# Patient Record
(demographics unavailable — no encounter records)

---

## 2024-10-31 NOTE — HEALTH RISK ASSESSMENT
[No] : No [Little interest or pleasure doing things] : 1) Little interest or pleasure doing things [Feeling down, depressed, or hopeless] : 2) Feeling down, depressed, or hopeless [0] : 2) Feeling down, depressed, or hopeless: Not at all (0) [PHQ-2 Negative - No further assessment needed] : PHQ-2 Negative - No further assessment needed [[id number]] : 0 [Never] : Never [With Family] : lives with family [# of Members in Household ___] :  household currently consist of [unfilled] member(s) [Less Than High School] : less than high school [] :  [# Of Children ___] : has [unfilled] children [Feels Safe at Home] : Feels safe at home [MammogramDate] : 7/09/24 [PapSmearDate] : 2023 [ColonoscopyDate] : never

## 2024-10-31 NOTE — ASSESSMENT
[FreeTextEntry1] : Physical  UTD with mammogram referred to GI for colonoscopy  Hx of hypothyroidism cont [place] Thyroid 15mg daily We'll check TSH/T4 today.  HLD - well controlled Pt is currently on Rosuvastatin 20 mg daily. we will decrease Rosuvastatin to 10mg Reinforced the importance of following a low cholesterol/low fat diet We'll check Lipid panel and LFTs today.  HTN, Hx of A fib cont Metoprolol 50 mg daily cont Eliquis 5mg BID cont Multaq Reinforced the importance of following a low sodium diet/increased physical activity.  DM II cont Mounjaro 12.5 mg weekly Reinforced the importance of following a low sugar/low carbohydrate diet We'll check glucose and HbA1c today.  All meds renewed.  declines flu vaccine  Blood work ordered. Follow up in one week for lab results.

## 2024-10-31 NOTE — HISTORY OF PRESENT ILLNESS
[de-identified] : Ms. TYLER PRESSLEY is a 53 year old female with Hx of HTN, A fib, DM II, HLD, hypothyroidism, who presents for an annual physical exam, Rx refills.   Pt states she is feeling well. Offers no complaints. Denies any SOB, CP, abdominal pain, N/V/D, headache, dizziness, or leg swelling. Reports compliance with taking her meds daily.

## 2024-10-31 NOTE — ASSESSMENT
[FreeTextEntry1] : Physical  UTD with mammogram referred to GI for colonoscopy  Hx of hypothyroidism cont [place] Thyroid 15mg daily We'll check TSH/T4 today.  HLD - well controlled Pt is currently on Rosuvastatin 20 mg daily. we will decrease Rosuvastatin to 10mg Reinforced the importance of following a low cholesterol/low fat diet We'll check Lipid panel and LFTs today.  HTN, Hx of A fib cont Metoprolol 50 mg daily cont Eliquis 5mg BID cont Multaq Reinforced the importance of following a low sodium diet/increased physical activity.  DM II cont Mounjaro 12.5 mg weekly Reinforced the importance of following a low sugar/low carbohydrate diet We'll check glucose and HbA1c today.  All meds renewed.  declines flu vaccine  Blood work ordered. Follow up in one week for lab results.

## 2024-10-31 NOTE — HEALTH RISK ASSESSMENT
[No] : No [Little interest or pleasure doing things] : 1) Little interest or pleasure doing things [Feeling down, depressed, or hopeless] : 2) Feeling down, depressed, or hopeless [0] : 2) Feeling down, depressed, or hopeless: Not at all (0) [PHQ-2 Negative - No further assessment needed] : PHQ-2 Negative - No further assessment needed [[id number]] : 0 [Never] : Never [With Family] : lives with family [# of Members in Household ___] :  household currently consist of [unfilled] member(s) [Less Than High School] : less than high school [] :  [# Of Children ___] : has [unfilled] children [Feels Safe at Home] : Feels safe at home [MammogramDate] : 7/09/24 [PapSmearDate] : 2023 [ColonoscopyDate] : never

## 2024-10-31 NOTE — ADDENDUM
[FreeTextEntry1] : Documented by Ivonne Chavez acting as a scribe for Dr. Kadi Snyder. 10/31/2024   All medical record entries made by the scribe were at my, Dr. Kadi Snyder, direction and personally dictated by me on 10/31/2024. I have reviewed the chart and agree that the record accurately reflects my personal performance of the history, physical exam, assessment and plan. I have also personally directed, reviewed, and agreed with the chart.

## 2024-10-31 NOTE — HISTORY OF PRESENT ILLNESS
[de-identified] : Ms. TYLER PRESSLEY is a 53 year old female with Hx of HTN, A fib, DM II, HLD, hypothyroidism, who presents for an annual physical exam, Rx refills.   Pt states she is feeling well. Offers no complaints. Denies any SOB, CP, abdominal pain, N/V/D, headache, dizziness, or leg swelling. Reports compliance with taking her meds daily.

## 2025-02-05 NOTE — HISTORY OF PRESENT ILLNESS
[de-identified] : Ms. TYLER PRESSLEY is a 53 year old female with Hx of HTN, A fib, DM II, HLD, hypothyroid, who presents for a follow up visit, Rx refills.  Pt states she is feeling well.  Denies any SOB, CP, abdominal pain, N/V/D, headache, dizziness, or leg swelling. Reports compliance with taking her meds daily.

## 2025-02-05 NOTE — PHYSICAL EXAM
[No Acute Distress] : no acute distress [Well-Appearing] : well-appearing [Normal Sclera/Conjunctiva] : normal sclera/conjunctiva [Normal Outer Ear/Nose] : the outer ears and nose were normal in appearance [Normal Oropharynx] : the oropharynx was normal [No JVD] : no jugular venous distention [No Lymphadenopathy] : no lymphadenopathy [Supple] : supple [No Respiratory Distress] : no respiratory distress  [No Accessory Muscle Use] : no accessory muscle use [Clear to Auscultation] : lungs were clear to auscultation bilaterally [Normal Rate] : normal rate  [Regular Rhythm] : with a regular rhythm [Normal S1, S2] : normal S1 and S2 [No Murmur] : no murmur heard [No Edema] : there was no peripheral edema [Soft] : abdomen soft [Non Tender] : non-tender [Non-distended] : non-distended [Normal Anterior Cervical Nodes] : no anterior cervical lymphadenopathy [No CVA Tenderness] : no CVA  tenderness [No Joint Swelling] : no joint swelling [No Rash] : no rash [Coordination Grossly Intact] : coordination grossly intact [No Focal Deficits] : no focal deficits [Normal Gait] : normal gait [Normal Affect] : the affect was normal [Normal Insight/Judgement] : insight and judgment were intact

## 2025-02-05 NOTE — ASSESSMENT
[FreeTextEntry1] : Follow up  Hx of hypothyroid cont [place] Thyroid 15mg daily  We'll check TSH/T4 today.  HLD pt wants to stop Rosuvastatin we'll decrease Rosuvastatin to 5mg daily -Rx sent to pharmacy Reinforced the importance of following a low cholesterol/low fat diet We'll check Lipid panel and LFTs today.  HTN, Hx of A fib cont Metoprolol 50 mg daily cont Eliquis 5mg BID Reinforced the importance of following a low sodium diet/increased physical activity.  DM II cont Mounjaro 12.5 mg weekly Reinforced the importance of following a low sugar/low carbohydrate diet We'll check glucose and HbA1c today.  All meds renewed.  Blood work ordered. Follow up in one week for lab results

## 2025-02-05 NOTE — ASSESSMENT
[FreeTextEntry1] : Follow up  Hx of hypothyroid cont [place] Thyroid 15mg daily  We'll check TSH/T4 today.  HLD pt wants to stop Rosuvastatin we'll decrease Rosuvastatin to 5mg daily -Rx sent to pharmacy Reinforced the importance of following a low cholesterol/low fat diet We'll check Lipid panel and LFTs today.  HTN, Hx of A fib cont Metoprolol 50 mg daily cont Eliquis 5mg BID Reinforced the importance of following a low sodium diet/increased physical activity.  DM II cont Mounjaro 12.5 mg weekly Reinforced the importance of following a low sugar/low carbohydrate diet We'll check glucose and HbA1c today.  All meds renewed.  Blood work ordered. Follow up in one week for lab results

## 2025-02-05 NOTE — HISTORY OF PRESENT ILLNESS
[de-identified] : Ms. TYLER PRESSLEY is a 53 year old female with Hx of HTN, A fib, DM II, HLD, hypothyroid, who presents for a follow up visit, Rx refills.  Pt states she is feeling well.  Denies any SOB, CP, abdominal pain, N/V/D, headache, dizziness, or leg swelling. Reports compliance with taking her meds daily.

## 2025-06-11 NOTE — PHYSICAL EXAM
[No Acute Distress] : no acute distress [Well-Appearing] : well-appearing [Normal Sclera/Conjunctiva] : normal sclera/conjunctiva [Normal Outer Ear/Nose] : the outer ears and nose were normal in appearance [No JVD] : no jugular venous distention [No Lymphadenopathy] : no lymphadenopathy [No Respiratory Distress] : no respiratory distress  [Supple] : supple [Clear to Auscultation] : lungs were clear to auscultation bilaterally [No Accessory Muscle Use] : no accessory muscle use [Regular Rhythm] : with a regular rhythm [Normal Rate] : normal rate  [No Murmur] : no murmur heard [Normal S1, S2] : normal S1 and S2 [No Edema] : there was no peripheral edema [Non Tender] : non-tender [Soft] : abdomen soft [Normal Anterior Cervical Nodes] : no anterior cervical lymphadenopathy [Non-distended] : non-distended [No CVA Tenderness] : no CVA  tenderness [No Rash] : no rash [No Joint Swelling] : no joint swelling [Coordination Grossly Intact] : coordination grossly intact [No Focal Deficits] : no focal deficits [Normal Gait] : normal gait [Normal Insight/Judgement] : insight and judgment were intact [Normal Affect] : the affect was normal

## 2025-06-11 NOTE — PHYSICAL EXAM
[No Acute Distress] : no acute distress [Well-Appearing] : well-appearing [Normal Sclera/Conjunctiva] : normal sclera/conjunctiva [Normal Outer Ear/Nose] : the outer ears and nose were normal in appearance [No JVD] : no jugular venous distention [No Lymphadenopathy] : no lymphadenopathy [No Respiratory Distress] : no respiratory distress  [Supple] : supple [No Accessory Muscle Use] : no accessory muscle use [Clear to Auscultation] : lungs were clear to auscultation bilaterally [Regular Rhythm] : with a regular rhythm [Normal Rate] : normal rate  [Normal S1, S2] : normal S1 and S2 [No Murmur] : no murmur heard [No Edema] : there was no peripheral edema [Non Tender] : non-tender [Soft] : abdomen soft [Normal Anterior Cervical Nodes] : no anterior cervical lymphadenopathy [Non-distended] : non-distended [No CVA Tenderness] : no CVA  tenderness [No Joint Swelling] : no joint swelling [No Rash] : no rash [No Focal Deficits] : no focal deficits [Coordination Grossly Intact] : coordination grossly intact [Normal Gait] : normal gait [Normal Insight/Judgement] : insight and judgment were intact [Normal Affect] : the affect was normal

## 2025-06-12 NOTE — ASSESSMENT
[FreeTextEntry1] : Follow up  Hx of hypothyroid cont [place] Thyroid 15mg daily  We'll check TSH/T4 today.  HLD cont Rosuvastatin to 5mg daily Reinforced the importance of following a low cholesterol/low fat diet We'll check Lipid panel and LFTs today.  HTN, Hx of A fib cont Metoprolol 50 mg daily cont Eliquis 5mg BID Reinforced the importance of following a low sodium diet/increased physical activity.  DM II cont Mounjaro 12.5 mg weekly Reinforced the importance of following a low sugar/low carbohydrate diet We'll check glucose and HbA1c today.  perimenopausal cont veozah 45mg daily  All meds renewed.  Blood work ordered. Follow up in one week for lab results

## 2025-06-12 NOTE — ADDENDUM
[FreeTextEntry1] : Documented by Juan Mckeon acting as a scribe for Dr. Kadi Snyder. 06/11/2025  All medical records entries made by the scribe were at my, Dr. Snyder, direction and personally dictated by me on 06/11/2025. I have reviewed the chart and agree that the record accurately reflects my personal performance of the history, physical exam, assessment and plan. I have also personally directed, reviewed, and agreed with the chart.

## 2025-06-12 NOTE — HISTORY OF PRESENT ILLNESS
[de-identified] : Ms. TYLER PRESSLEY is a 54 year old female with hx of HTN, A fib, DM II, HLD, hypothyroid, presenting for a follow up visit. Pt is accompanied by her family.   Pt c/o joint pain/stiffness in her b/l hands. Takes motrin or tylenol arthritis which she says helps Denies any SOB, CP, abdominal pain, N/V/D, headache, dizziness, or leg swelling.   Reports compliance with taking her meds daily.

## 2025-06-12 NOTE — ASSESSMENT
[FreeTextEntry1] : Follow up  Hx of hypothyroid cont [place] Thyroid 15mg daily  We'll check TSH/T4 today.  HLD cont Rosuvastatin to 5mg daily Reinforced the importance of following a low cholesterol/low fat diet We'll check Lipid panel and LFTs today.  HTN, Hx of A fib cont Metoprolol 50 mg daily cont Eliquis 5mg BID Reinforced the importance of following a low sodium diet/increased physical activity.  DM II cont Mounjaro 12.5 mg weekly Reinforced the importance of following a low sugar/low carbohydrate diet We'll check glucose and HbA1c today.  perimenopausal cont veozah 45mg daily  All meds renewed.  Blood work ordered. Follow up in one week for lab results

## 2025-06-12 NOTE — HISTORY OF PRESENT ILLNESS
[de-identified] : Ms. TYLER PRESSLEY is a 54 year old female with hx of HTN, A fib, DM II, HLD, hypothyroid, presenting for a follow up visit. Pt is accompanied by her family.   Pt c/o joint pain/stiffness in her b/l hands. Takes motrin or tylenol arthritis which she says helps Denies any SOB, CP, abdominal pain, N/V/D, headache, dizziness, or leg swelling.   Reports compliance with taking her meds daily.